# Patient Record
Sex: FEMALE | Race: WHITE
[De-identification: names, ages, dates, MRNs, and addresses within clinical notes are randomized per-mention and may not be internally consistent; named-entity substitution may affect disease eponyms.]

---

## 2017-12-19 ENCOUNTER — HOSPITAL ENCOUNTER (OUTPATIENT)
Dept: HOSPITAL 92 - ULT | Age: 57
Discharge: HOME | End: 2017-12-19
Attending: FAMILY MEDICINE
Payer: COMMERCIAL

## 2017-12-19 DIAGNOSIS — B19.20: Primary | ICD-10-CM

## 2017-12-19 DIAGNOSIS — K80.20: ICD-10-CM

## 2017-12-19 PROCEDURE — 76700 US EXAM ABDOM COMPLETE: CPT

## 2017-12-19 NOTE — ULT
ABDOMINAL SONOGRAM:

 

HISTORY:

Hepatitis.

 

FINDINGS:

Non-shadowing sludge and echogenic stones are present within the gallbladder lumen.  There is no gall
bladder wall thickening or pericholecystic fluid.  Common duct is 0.6 cm diameter.  Liver is unremark
able without focal mass or intrahepatic biliary dilatation.  No free fluid is visible.  Spleen is upp
er limits of normal at 12.6 cm.  The kidneys and visualized portions of the abdominal aorta, IVC, and
 pancreas have a normal appearance.

 

IMPRESSION:  

Cholelithiasis.  No evidence of acute biliary obstruction.

 

POS: [facility]

## 2018-05-18 ENCOUNTER — HOSPITAL ENCOUNTER (OUTPATIENT)
Dept: HOSPITAL 92 - SDC | Age: 58
Discharge: HOME | End: 2018-05-18
Attending: SURGERY
Payer: COMMERCIAL

## 2018-05-18 VITALS — BODY MASS INDEX: 25.7 KG/M2

## 2018-05-18 DIAGNOSIS — Z79.84: ICD-10-CM

## 2018-05-18 DIAGNOSIS — Z79.82: ICD-10-CM

## 2018-05-18 DIAGNOSIS — E11.9: ICD-10-CM

## 2018-05-18 DIAGNOSIS — K81.1: Primary | ICD-10-CM

## 2018-05-18 DIAGNOSIS — K66.0: ICD-10-CM

## 2018-05-18 DIAGNOSIS — I10: ICD-10-CM

## 2018-05-18 DIAGNOSIS — Z79.899: ICD-10-CM

## 2018-05-18 DIAGNOSIS — Z87.891: ICD-10-CM

## 2018-05-18 DIAGNOSIS — K42.9: ICD-10-CM

## 2018-05-18 PROCEDURE — BF101ZZ FLUOROSCOPY OF BILE DUCTS USING LOW OSMOLAR CONTRAST: ICD-10-PCS | Performed by: SURGERY

## 2018-05-18 PROCEDURE — 47532 INJECTION FOR CHOLANGIOGRAM: CPT

## 2018-05-18 PROCEDURE — 36416 COLLJ CAPILLARY BLOOD SPEC: CPT

## 2018-05-18 PROCEDURE — 0FT44ZZ RESECTION OF GALLBLADDER, PERCUTANEOUS ENDOSCOPIC APPROACH: ICD-10-PCS | Performed by: SURGERY

## 2018-05-18 PROCEDURE — 93005 ELECTROCARDIOGRAM TRACING: CPT

## 2018-05-18 PROCEDURE — 93010 ELECTROCARDIOGRAM REPORT: CPT

## 2018-05-18 PROCEDURE — 96374 THER/PROPH/DIAG INJ IV PUSH: CPT

## 2018-05-18 PROCEDURE — 88304 TISSUE EXAM BY PATHOLOGIST: CPT

## 2018-05-18 NOTE — PDOC.OP
Operative Note





- Operative Note


Operative Note: 





PROCEDURE: Laparoscopic cholecystectomy with intraoperative cholangiogram and 

repair of umbilical hernia





SURGEON: Bobby Mauricio M.D.





DATE OF PROCEDURE: 5/18/2018





PREOPERATIVE DIAGNOSIS: Cholelithiasis and cholecystitis, history of 

choledocholithiasis, umbilical hernia





POSTOPERATIVE DIAGNOSIS: Cholelithiasis and cholecystitis, umbilical hernia





HISTORY: Patient with a history of choledocholithiasis status post ERCP 2. 

Recommendation was made to proceed with laparoscopic cholecystectomy to prevent 

future episodes. She also has an incidentally noted umbilical hernia for which 

repair was recommended under the same anesthesia.





FINDINGS: Umbilical hernia containing preperitoneal fat. White walled 

gallbladder with duodenal adhesions. Normal intraoperative cholangiogram.





PROCEDURE IN DETAIL:  After informed consent was obtained and appropriate 

preoperative antibiotics were administered, the patient was taken to the 

operating room and placed in the supine position and general endotracheal 

anesthesia was administered.  The stomach was decompressed with an OG tube and 

the abdomen was prepped and draped in standard sterile fashion.  Local 

anesthesia was infused to the skin and subcutaneous tissues at the umbilical 

level. A circumumbilical incision was made and the hernia sac dissected free of 

the subcutaneous tissues down to the level of the fascia. The fascial defect 

was about a centimeter in diameter. The hernia was primarily preperitoneal fat 

was only a small empty sac centrally. The protruding tissues were excised using 

Bovie electrocautery and a 10 mm trocar placed through the fascial defect into 

the abdominal cavity. Carbon dioxide was insufflated without difficulty. 

Opening pressure was less than 5 and carbon dioxide gas easily insufflated to 

an intra-abdominal pressure of 15, which the patient tolerated well.   The 

abdominal cavity was carefully examined.  There was no evidence of  trocar 

injury.  Local anesthesia was infused to the skin and subcutaneous tissues at 

the epigastric, right upper quadrant, and right lateral abdominal sites and 

trocars were placed under direct vision of the laparoscope.  The fundus of the 

gallbladder was grasped and retracted superiorly. The infundibulum was grasped 

and retracted laterally.  The patient had some duodenal adhesions to the neck 

of the gallbladder and these adhesions were sharply divided close to the 

gallbladder avoiding use of electrocautery in the vicinity of the duodenum. The 

serosa was stripped inferiorly at the level of the neck of the gallbladder 

exposing the cystic duct and artery which were traced clearly to their 

insertion in the gallbladder. These were dissected free circumferentially and 

the cystic duct was clipped at the level of the neck of the gallbladder. The 

cystic artery was clipped but not divided. An incision was made in the cystic 

duct inferior to the clip and the cystic duct was palpated with no stones 

palpable. Clear bile was seen to flow from the cystic duct incision. A 

cholangiogram catheter was introduced and placed into the cystic duct and 

secured with a clip. A cholangiogram was obtained which showed an adequate 

length of cystic duct. There was normal filling of the common bile duct with 

free flow of contrast into the duodenum.  There was normal retrograde flow into 

the common hepatic duct but due to brisk flow of contrast into the duodenum the 

common hepatic duct could not be filled beyond the bifurcation even after 

administering morphine and placing the patient in steep Trendelenburg position. 

The cholangiogram catheter was removed and the cystic duct clipped below the 

incision in the cystic duct. The cystic duct was divided between these clips 

and the previously placed clip. The cystic artery was divided between the 

previously placed clips.  The gallbladder was then dissected free of the 

gallbladder bed using hook electrocautery.  Prior to complete removal of the 

gallbladder from the gallbladder bed, the area of the cystic duct and artery 

stumps was examined.  The clips were in good position completely across these 

structures and there was no bleeding and no leakage of bile. The gallbladder 

was then placed into an EndoCatch bag and drawn out through the umbilical 

incision.  The umbilical trocar was replaced and the operative site easily 

irrigated to clear. There was no significant bleeding or spillage of bile. The 

epigastric, right upper quadrant and right lateral abdominal trocars were 

removed and hemostasis verified.  Carbon dioxide gas was allowed to desufflate 

through the umbilical trocar which was then removed. The umbilical fascia was 

then reapproximated with interrupted figure-of-eight 0 Vicryl sutures with 

excellent technical result. Additional local anesthesia was infused at all 

incisions. The subcutaneous tissues at the umbilicus were reapproximated with 

Monocryl. The skin incisions were closed with 4-0 subcuticular Monocryl sutures 

and Dermabond  dressings were placed.   The Dermabond at the umbilical incision 

was dry a pressure dressing was placed.  The patient was extubated and taken to 

the recovery room in good condition.  There were no complications.  





ESTIMATED BLOOD LOSS: Minimal.  





SPECIMEN : Gallbladder and contents.

## 2018-05-18 NOTE — RAD
CHOLANGIOGRAM IN SURGERY:

 

HISTORY:

Cholelithiasis.

 

COMPARISON:

None.

 

FINDINGS/IMPRESSION:

Two limited intraoperative fluoroscopic views from a cholangiogram taken in surgery were submitted fo
r interpretation.  Contrast is seen in the cystic duct and distal common bile duct.  Contrast spills 
into the duodenum.  The contrast does not adequately fill the more proximal common bile duct or intra
hepatic biliary tree and an obstruction may or may not be present in this location.

 

POS: [facility]

## 2018-05-21 NOTE — EKG
Test Reason : PREOP

Blood Pressure : ***/*** mmHG

Vent. Rate : 051 BPM     Atrial Rate : 051 BPM

   P-R Int : 162 ms          QRS Dur : 088 ms

    QT Int : 448 ms       P-R-T Axes : -13 [phone] degrees

   QTc Int : 412 ms

 

Sinus bradycardia

Otherwise normal ECG

No previous ECGs available

Confirmed by MARIA L PLATT (2) on 5/21/2018 6:37:08 PM

 

Referred By:  MYA           Confirmed By:MARIA L PLATT

## 2019-07-31 ENCOUNTER — HOSPITAL ENCOUNTER (OUTPATIENT)
Dept: HOSPITAL 92 - SCSMAMMO | Age: 59
Discharge: HOME | End: 2019-07-31
Attending: FAMILY MEDICINE
Payer: COMMERCIAL

## 2019-07-31 DIAGNOSIS — Z80.3: ICD-10-CM

## 2019-07-31 DIAGNOSIS — Z12.31: Primary | ICD-10-CM

## 2019-07-31 PROCEDURE — 77067 SCR MAMMO BI INCL CAD: CPT
